# Patient Record
(demographics unavailable — no encounter records)

---

## 2024-12-12 NOTE — ADDENDUM
[FreeTextEntry1] : I, DIMITRIOS MOLINA, acted solely as a scribe for Dr. Bhaskar Martinez on this date 12/12/2024.  All medical record entries made by the Scribe were at my, Dr. Bhaskar Martinez, direction and personally dictated by me on 12/12/2024. I have reviewed the chart and agree that the record accurately reflects my personal performance of the history, physical exam, assessment and plan. I have also personally directed, reviewed, and agreed with the chart.

## 2024-12-12 NOTE — PHYSICAL EXAM
[de-identified] : Constitutional o Appearance : well-nourished, well developed, alert, in no acute distress  Head and Face o Head :  Inspection : atraumatic, normocephalic o Face :  Inspection : no visible rash or discoloration Respiratory o Respiratory Effort: breathing unlabored  Neurologic o Mental Status Examination :  Orientation : grossly oriented to person, place and time Psychiatric o Mood and Affect: mood normal, affect appropriate   Right Lower Extremity o Buttock : no tenderness, swelling or deformities  o Right Hip :  Inspection/Palpation : no tenderness, swelling or deformities  Range of Motion : full and slight limited rotation no crepitance  Stability : joint stability intact  Strength : extension, flexion, adduction, abduction, internal rotation and external rotation 5/5   o Right Knee :  Inspection/Palpation : lateral facet tenderness medial compartment tenderness  tenderness to palpation, no swelling  Range of Motion : 0-125 active flexion and extension full and painless, no crepitance, good patellofemoral glide   Stability : no valgus or varus instability present on provocative testing  Strength : flexion and extension 5/5  Tests and Signs : Anterior Drawer negative, Lachman negative, Young's negative  Left Lower Extremity o Buttock : no tenderness, swelling or deformities  o Left Hip :  Inspection/Palpation : no tenderness, no swelling or deformities  Range of Motion : full and painless in all planes, no crepitance  Stability : joint stability intact  Strength : extension, flexion, adduction, abduction, internal rotation and external rotation 5/5  o Left Knee :  Inspection/Palpation : no tenderness to palpation, no swelling  Range of Motion : 0-125 active flexion and extension full and painless, no crepitance  Stability : no valgus or varus instability present on provocative testing  Strength : flexion and extension 5/5  Tests and Signs : Anterior Drawer negative, Lachman negative, Young's negative  Gait: varus deformity of the right knee, varus thrust, no significant extremity swelling or lymphedema

## 2024-12-12 NOTE — DISCUSSION/SUMMARY
[de-identified] : I went over the pathophysiology of the patient's symptoms in great detail with the patient. He needs to avoid high-impact activities such as running and jumping or riding a treadmill. I recommend alternative activities such as riding a stationary bike or elliptical on low tension. He should focus on light weight and high repetition exercises. He should avoid squatting and kneeling. I stressed the importance of weight loss and its benefits to the patients joints and overall health. At-home strengthening exercises were discussed and demonstrated in great detail with the patient, with an exercise sheet being provided. I informed the patient they are due for a repeat gel injection any time after 1/25/2025 for the right knee.   All of their questions were answered. They understand and consent to the plan.  FU after 1/25/2025 for the right knee gel.  .

## 2024-12-12 NOTE — HISTORY OF PRESENT ILLNESS
[de-identified] : 73 year old male presents for a right knee follow-up. He denies any swelling or buckling. He had a right knee Monovisc injection today 7/25/2024 and notes relief. He notes his right knee is doing well. He denies any swelling or buckling. He had a right knee cortisone injection 7/2/2024. He notes he did not get dramatic relief from the cortisone injection. He has bilateral hip replacements. He presents with his wife. He is known to be bone on bone of his right knee.   Radiology Results: 7/2/2024 Right Knee: Standing AP, lateral, tunnel and skyline views were obtained and reveal bone on bone in the medial compartment with moderate   [facility] MRI right knee done 6/25/2024 IMPRESSION;  1. Findings concerning for either subtle subchondral insufficiency fracturing or stress edema of the medial tibial plateau 2. Degenerative changes patellofemoral arthritis changes which are most pronounces in the patellofemoral and medial compartment 3. Medial meniscal tear 4. Small joint effusion and joint bodies  5. Moderate popliteal cyst with findings of leakage

## 2025-02-10 NOTE — DISCUSSION/SUMMARY
[de-identified] : I went over the pathophysiology of the patient's symptoms in great detail with the patient. He needs to avoid high-impact activities such as running and jumping or riding a treadmill. I recommend alternative activities such as riding a stationary bike or elliptical on low tension. He should focus on light weight and high repetition exercises. He should avoid squatting and kneeling. I stressed the importance of weight loss and its benefits to the patients joints and overall health. At-home strengthening exercises were discussed and demonstrated in great detail with the patient, with an exercise sheet being provided.  All of their questions were answered. They understand and consent to the plan.  FU 6 weeks.  .

## 2025-02-10 NOTE — HISTORY OF PRESENT ILLNESS
[de-identified] : 73 year old male presents for a right knee Monovisc injection today. He denies any swelling or buckling. He had a right knee cortisone injection 7/2/2024. He notes he did not get dramatic relief from the cortisone injection. He has bilateral hip replacements. He presents with his wife. He is known to be bone on bone of his right knee.   Radiology Results: 7/2/2024 Right Knee: Standing AP, lateral, tunnel and skyline views were obtained and reveal bone on bone in the medial compartment with moderate   [facility] MRI right knee done 6/25/2024 IMPRESSION;  1. Findings concerning for either subtle subchondral insufficiency fracturing or stress edema of the medial tibial plateau 2. Degenerative changes patellofemoral arthritis changes which are most pronounces in the patellofemoral and medial compartment 3. Medial meniscal tear 4. Small joint effusion and joint bodies  5. Moderate popliteal cyst with findings of leakage

## 2025-02-10 NOTE — PHYSICAL EXAM
[de-identified] : Constitutional o Appearance : well-nourished, well developed, alert, in no acute distress  Head and Face o Head :  Inspection : atraumatic, normocephalic o Face :  Inspection : no visible rash or discoloration Respiratory o Respiratory Effort: breathing unlabored  Neurologic o Mental Status Examination :  Orientation : grossly oriented to person, place and time Psychiatric o Mood and Affect: mood normal, affect appropriate   Right Lower Extremity o Buttock : no tenderness, swelling or deformities  o Right Hip :  Inspection/Palpation : no tenderness, swelling or deformities  Range of Motion : full and slight limited rotation no crepitance  Stability : joint stability intact  Strength : extension, flexion, adduction, abduction, internal rotation and external rotation 5/5   o Right Knee :  Inspection/Palpation :medial and lateral facet tenderness medial compartment tenderness  tenderness to palpation, no swelling  Range of Motion : 0-125 active flexion and extension full and painless, no crepitance, good patellofemoral glide   Stability : no valgus or varus instability present on provocative testing  Strength : flexion and extension 5/5  Tests and Signs : Anterior Drawer negative, Lachman negative, Young's negative  Left Lower Extremity o Buttock : no tenderness, swelling or deformities  o Left Hip :  Inspection/Palpation : no tenderness, no swelling or deformities  Range of Motion : full and painless in all planes, no crepitance  Stability : joint stability intact  Strength : extension, flexion, adduction, abduction, internal rotation and external rotation 5/5  o Left Knee :  Inspection/Palpation : no tenderness to palpation, no swelling  Range of Motion : 0-125 active flexion and extension full and painless, no crepitance  Stability : no valgus or varus instability present on provocative testing  Strength : flexion and extension 5/5  Tests and Signs : Anterior Drawer negative, Lachman negative, Young's negative  Gait: varus deformity of the right knee, varus thrust, no significant extremity swelling or lymphedema  Injection  o Right Knee : Indication- OA, Anatomic location- knee intra-articular joint space, Spray - area was sterilized with Betadine and alcohol and anesthetized with Ethyl Chloride , needle used-20G, Medications given- 4cc's Monovisc , and patient tolerated it well. This was done under ultrasound guidance.

## 2025-03-25 NOTE — HISTORY OF PRESENT ILLNESS
[de-identified] : 73 year old male presents for a right knee follow-up. He had a right knee Monovisc injection 2/10/2025 and reports he is doing really well. He denies any swelling or buckling. He is thrilled with his progress and results. He had a right knee cortisone injection 7/2/2024. He notes he did not get dramatic relief from the cortisone injection. He has bilateral hip replacements. He presents with his wife. He is known to be bone on bone of his right knee.   Radiology Results: 7/2/2024 Right Knee: Standing AP, lateral, tunnel and skyline views were obtained and reveal bone on bone in the medial compartment with moderate   [facility] MRI right knee done 6/25/2024 IMPRESSION;  1. Findings concerning for either subtle subchondral insufficiency fracturing or stress edema of the medial tibial plateau 2. Degenerative changes patellofemoral arthritis changes which are most pronounces in the patellofemoral and medial compartment 3. Medial meniscal tear 4. Small joint effusion and joint bodies  5. Moderate popliteal cyst with findings of leakage

## 2025-03-25 NOTE — ADDENDUM
[FreeTextEntry1] : I, DIMITRIOS MOLINA, acted solely as a scribe for Dr. Bhaskar Martinez on this date 03/25/2025.  All medical record entries made by the Scribe were at my, Dr. Bhaskar Martinez, direction and personally dictated by me on 03/25/2025. I have reviewed the chart and agree that the record accurately reflects my personal performance of the history, physical exam, assessment and plan. I have also personally directed, reviewed, and agreed with the chart.

## 2025-03-25 NOTE — DISCUSSION/SUMMARY
[de-identified] : I went over the pathophysiology of the patient's symptoms in great detail with the patient. He needs to avoid high-impact activities such as running and jumping or riding a treadmill. I recommend alternative activities such as riding a stationary bike or elliptical on low tension. He should focus on light weight and high repetition exercises. He should avoid squatting and kneeling. I informed the patient they are due for a repeat gel injection any time after 8/10/2025 for the right knee.  All of their questions were answered. They understand and consent to the plan.   FU in 2-3 months.

## 2025-03-25 NOTE — PHYSICAL EXAM
[de-identified] : Constitutional o Appearance : well-nourished, well developed, alert, in no acute distress  Head and Face o Head :  Inspection : atraumatic, normocephalic o Face :  Inspection : no visible rash or discoloration Respiratory o Respiratory Effort: breathing unlabored  Neurologic o Mental Status Examination :  Orientation : grossly oriented to person, place and time Psychiatric o Mood and Affect: mood normal, affect appropriate   Right Lower Extremity o Buttock : no tenderness, swelling or deformities  o Right Hip :  Inspection/Palpation : no tenderness, swelling or deformities  Range of Motion : full and slight limited rotation no crepitance  Stability : joint stability intact  Strength : extension, flexion, adduction, abduction, internal rotation and external rotation 5/5   o Right Knee :  Inspection/Palpation : no medial and lateral facet tendernes,  to palpation, no swelling  Range of Motion : 0-130 active flexion and extension full and painless, no crepitance, decrease patellofemoral glide   Stability : no valgus or varus instability present on provocative testing  Strength : flexion and extension 5/5  Tests and Signs : Anterior Drawer negative, Lachman negative, Young's negative  Left Lower Extremity o Buttock : no tenderness, swelling or deformities  o Left Hip :  Inspection/Palpation : no tenderness, no swelling or deformities  Range of Motion : full and painless in all planes, no crepitance  Stability : joint stability intact  Strength : extension, flexion, adduction, abduction, internal rotation and external rotation 5/5  o Left Knee :  Inspection/Palpation : no tenderness to palpation, no swelling  Range of Motion : 0-125 active flexion and extension full and painless, no crepitance  Stability : no valgus or varus instability present on provocative testing  Strength : flexion and extension 5/5  Tests and Signs : Anterior Drawer negative, Lachman negative, Young's negative  Gait: varus deformity of the right knee, varus thrust, limited core strength, no significant extremity swelling or lymphedema